# Patient Record
Sex: FEMALE | Race: WHITE | NOT HISPANIC OR LATINO
[De-identification: names, ages, dates, MRNs, and addresses within clinical notes are randomized per-mention and may not be internally consistent; named-entity substitution may affect disease eponyms.]

---

## 2024-04-22 PROBLEM — Z00.00 ENCOUNTER FOR PREVENTIVE HEALTH EXAMINATION: Status: ACTIVE | Noted: 2024-04-22

## 2024-04-25 ENCOUNTER — NON-APPOINTMENT (OUTPATIENT)
Age: 24
End: 2024-04-25

## 2024-04-25 ENCOUNTER — APPOINTMENT (OUTPATIENT)
Dept: OTOLARYNGOLOGY | Facility: CLINIC | Age: 24
End: 2024-04-25
Payer: COMMERCIAL

## 2024-04-25 VITALS
WEIGHT: 135 LBS | HEIGHT: 64 IN | DIASTOLIC BLOOD PRESSURE: 68 MMHG | HEART RATE: 56 BPM | TEMPERATURE: 97.8 F | OXYGEN SATURATION: 99 % | SYSTOLIC BLOOD PRESSURE: 103 MMHG | BODY MASS INDEX: 23.05 KG/M2

## 2024-04-25 DIAGNOSIS — R49.0 DYSPHONIA: ICD-10-CM

## 2024-04-25 DIAGNOSIS — Z80.41 FAMILY HISTORY OF MALIGNANT NEOPLASM OF OVARY: ICD-10-CM

## 2024-04-25 DIAGNOSIS — J02.9 ACUTE PHARYNGITIS, UNSPECIFIED: ICD-10-CM

## 2024-04-25 DIAGNOSIS — J06.9 ACUTE UPPER RESPIRATORY INFECTION, UNSPECIFIED: ICD-10-CM

## 2024-04-25 DIAGNOSIS — Z78.9 OTHER SPECIFIED HEALTH STATUS: ICD-10-CM

## 2024-04-25 DIAGNOSIS — J38.2 NODULES OF VOCAL CORDS: ICD-10-CM

## 2024-04-25 DIAGNOSIS — Z80.3 FAMILY HISTORY OF MALIGNANT NEOPLASM OF BREAST: ICD-10-CM

## 2024-04-25 PROCEDURE — 99204 OFFICE O/P NEW MOD 45 MIN: CPT | Mod: 25

## 2024-04-25 PROCEDURE — 31575 DIAGNOSTIC LARYNGOSCOPY: CPT

## 2024-04-25 NOTE — HISTORY OF PRESENT ILLNESS
[de-identified] : 4/25/24 24 yo female who have strep throat in February of 2024.  She was treated with amoxicillin, she had recurrent symptoms with throat pain.  She went to Urgent care, but was negative for strep but was given amoxicillin and then Medrol.  She did feel better, ultimately has GI upset.  Was dx with norovirus.  She recovered from this, but then symptoms recurred and was then negative for strep.  Was given Augmentin and Medrol again, now on last day.  She was seen by an ENT, viral and bacterial cultures, which was negative, but viral cultures were redone.  Currently has been well over the past week.

## 2024-04-25 NOTE — PHYSICAL EXAM
[Normal] : mucosa is normal [Midline] : trachea located in midline position [de-identified] : tonsil 3/4 bilaterally

## 2024-04-25 NOTE — PROCEDURE
[de-identified] : - Pre-operative Diagnosis: Dysphonia, throat discomfort Post-operative Diagnosis: Bilateral vocal fold nodules Anesthesia: Topical - 1 % Lidocaine/Phenylephrine Procedure:  Flexible Laryngoscopy   Procedure Details:   The patient was placed in the sitting position.  After decongestant and anesthesia were applied the laryngoscope was passed.  The nasal cavities, nasopharynx, oropharynx, hypopharynx, and larynx were all examined.  Vocal folds were examined during respiration and phonation.  The following findings were noted:  Findings:   Nose: Septum is midline, turbinates are normal, nasal airways patent, mucosa normal Nasopharynx: Adenoids normal, no masses, eustachian tube normal Oropharynx: Pharyngeal walls symmetric and without lesion. Tonsils/fossae symmetric Hypopharynx: Hypopharynx and pyriform sinuses without lesion. No masses or asymmetry.  No pooling of secretions. Larynx:  Epiglottis and aryepiglottic folds were sharp and crisp bilaterally.  Bilateral false and true vocal folds normal appearance other than evidence of bilateral vocal fold nodules. Bilateral vocal folds fully mobile and symmetric.  Airway was widely patent.  Condition: Stable.  Patient tolerated procedure well.  Complications: None

## 2024-04-25 NOTE — ASSESSMENT
[FreeTextEntry1] : - 4/25/24 23-year-old female who presents with concern for recent pharyngitis that has been recurrent and persisting.  Initially with strep positive infection in February, now testing without strep but consistent with viral pharyngitis.  Has been taking antibiotics as well as steroids.  Patient with temporary relief.  Improving currently today.  At this time examination is normal.  I am recommending rest, hydration, Tylenol/Motrin for discomfort.  I am recommending consultation with infectious disease if symptoms are persisting despite findings of bacterial infection.  In terms of voice she is dysphonic.  She has a voice demanding job and consulting/wealth management.  I am recommending voice hygiene and consultation with speech therapy.  Patient would like to hold off for now.  -Rest, hydration, Tylenol/Motrin - Handwashing, do not share glasses, avoid sick contacts -Consider SLP, patient would like to observe -If symptoms persist consider consultation with infectious disease